# Patient Record
Sex: MALE | Race: BLACK OR AFRICAN AMERICAN | Employment: UNEMPLOYED | URBAN - METROPOLITAN AREA
[De-identification: names, ages, dates, MRNs, and addresses within clinical notes are randomized per-mention and may not be internally consistent; named-entity substitution may affect disease eponyms.]

---

## 2018-05-10 ENCOUNTER — HOSPITAL ENCOUNTER (OUTPATIENT)
Dept: GENERAL RADIOLOGY | Facility: CLINIC | Age: 83
Discharge: HOME | End: 2018-05-10
Attending: NURSE PRACTITIONER | Admitting: NURSE PRACTITIONER
Payer: COMMERCIAL

## 2018-05-10 DIAGNOSIS — R76.11 POSITIVE TB TEST: ICD-10-CM

## 2018-05-10 PROCEDURE — 71046 X-RAY EXAM CHEST 2 VIEWS: CPT

## 2019-06-12 ENCOUNTER — TRANSFERRED RECORDS (OUTPATIENT)
Dept: HEALTH INFORMATION MANAGEMENT | Facility: CLINIC | Age: 84
End: 2019-06-12

## 2019-08-27 ENCOUNTER — MEDICAL CORRESPONDENCE (OUTPATIENT)
Dept: HEALTH INFORMATION MANAGEMENT | Facility: CLINIC | Age: 84
End: 2019-08-27

## 2019-08-27 NOTE — TELEPHONE ENCOUNTER
RECORDS RECEIVED FROM:  Positive TB Quantiferon Gold- referred by the [facility]- appt per Referring Clinic   DATE RECEIVED: 09.23.2019   NOTES (Gather within 2 years) STATUS DETAILS   OFFICE NOTE from referring provider   Received 08.27.2019   OFFICE NOTE from other specialist N/A    DISCHARGE SUMMARY from hospital N/A    DISCHARGE REPORT from the ER N/A    LABS (any labs) Care Everywhere    MEDICATION LIST Received 08.27.2019   IMAGING  (NEED IMAGES AND REPORTS)     Osteomyelitis: Foot imaging  N/A    Liver Abscess: Abdominal imaging N/A    Other (anything related to diagnoses N/A

## 2019-09-23 ENCOUNTER — PRE VISIT (OUTPATIENT)
Dept: INFECTIOUS DISEASES | Facility: CLINIC | Age: 84
End: 2019-09-23

## 2019-09-23 ENCOUNTER — OFFICE VISIT (OUTPATIENT)
Dept: INFECTIOUS DISEASES | Facility: CLINIC | Age: 84
End: 2019-09-23
Attending: STUDENT IN AN ORGANIZED HEALTH CARE EDUCATION/TRAINING PROGRAM
Payer: COMMERCIAL

## 2019-09-23 VITALS
HEART RATE: 98 BPM | DIASTOLIC BLOOD PRESSURE: 108 MMHG | SYSTOLIC BLOOD PRESSURE: 177 MMHG | WEIGHT: 219.1 LBS | OXYGEN SATURATION: 97 % | TEMPERATURE: 99.1 F

## 2019-09-23 DIAGNOSIS — R76.12 POSITIVE QUANTIFERON-TB GOLD TEST: Primary | ICD-10-CM

## 2019-09-23 DIAGNOSIS — Z23 NEED FOR VACCINATION: ICD-10-CM

## 2019-09-23 PROCEDURE — G0463 HOSPITAL OUTPT CLINIC VISIT: HCPCS | Mod: ZF

## 2019-09-23 ASSESSMENT — PAIN SCALES - GENERAL: PAINLEVEL: NO PAIN (0)

## 2019-09-23 NOTE — PROGRESS NOTES
[facility]  Infectious Disease Consultation note  Today's Date: 09/23/2019    Assessment and Recommendations:  Jan Tucker is a 84 year old with PMH of HTN, HLD, asthma who is referred for positive quantiferon gold.     Positive quantiferon gold: likely true positive since he is from [place] and because of possible exposure to sister with TB. However he was treated for latent TB in 2004. Therefore I dont think he needs further eval or treatment for latent TB.     - Immunization status; needs flu and shingrix vaccination. Wrote letter to PCP about the same.     Attestation: I have reviewed today's vital signs, medications, labs and imaging. I personally reviewed the imaging. Reviewed medical history, surgical history, and family history in Epic History tab. No updates needed to be made.     Magdalena Olguin  , [facility]  Pager - [phone]    -------------------------------------------------------------------------------------------------------------------    Reason for consult / Chief complaint:   Consulted by Dr. Juan Way for positive quantiferon    History of presenting illness:  Jan Tucker is a 84 year old with PMH of HTN, HLD, asthma who is referred for positive quantiferon gold.     He is from [facility], moved to  [place] years ago. 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 He does not remember how long he took he took the medicine but took it for as long as he was supposed to without interruption. Since then he has been back to [facility] 3 times, 2 months each.     He was found to have a positive quantiferon gold at 0.5 in 5/2018. He had a CXR which was normal. He has no symptoms of active TB. His sister had a lot of cough and was diagnosed with TB 25 years ago. [place] the city.        Social Hx:  Social History     Tobacco Use     Smoking status: Never Smoker     Smokeless tobacco: Never Used   Substance Use Topics     Alcohol use: None     Drug use: None         Immunizations:  Tdap 2013  Typhoid, yellow fever 2010  Varicella 2004  Pneumovax 2017  prevnar 2003  Mening 2010    Allergies:   No Known Allergies      Medications:    acetaminophen (TYLENOL) 500 mg tablet Take 1 Tab by mouth every 6 (six) hours as needed for pain Not to exceed 2000 mg per day. 90 Tab 0     peg 400-propylene glycol, PF, (SYSTANE) 0.4-0.3 % ophthalmic solution Place 2 Drops into both eyes as needed for dry eyes 28 Each 1     albuterol sulfate (PROAIR HFA) 90 mcg/actuation inhaler Inhale 2 Puffs into the lungs every 4 (four) hours as needed for shortness of breath or wheezing 8.5 g 11     amLODIPine (NORVASC) 10 mg tablet Take 1 Tab by mouth once daily 30 Tab 11     atorvastatin (LIPITOR) 80 mg tablet Take 1 Tab by mouth once daily Replaces 40 mg tab 30 Tab 11     cholecalciferol, vitamin D3, 2,000 unit tablet Take 1 Tab by mouth once daily 100 Tab 3     lisinopril-hydrochlorothiazide (PRINZIDE,ZESTORETIC) 20-12.5 mg per tablet Take 1 Tab by mouth once daily 30 Tab 11     mometasone-formoterol (DULERA) 200-5 mcg/actuation inhaler Inhale 2 Puffs into the lungs 2 (two) times daily Do not exceed 2 puffs twice daily. 3 inhalers for travel. 13 g 11       Past Medical Hx:  As in HPI    Family History:  Family history of TB +    Review of Systems:  10 systems reviewed and were negative except for pertinent positives noted in my HPI      Examination:  Vital signs:   BP (!) 177/108   Pulse 98   Temp 99.1  F (37.3  C) (Oral)   Wt 99.4 kg (219 lb 1.6 oz)   SpO2 97%     Constitutional: Patient in no distress  Eyes: not pale, not jaundiced  Neck: no lymphadenopathy  CVS: no added sounds  RS: clear  Abdomen: soft, BS+  Skin: no rash  Extremities: no pedal edema  Psych: Alert and oriented x 3    Laboratory:  8/2019  CMP  reviewed     Microbiology:  5/9/18  QUANTIFERON(R)-TB GOLD POSITIVE (A)   Comment:  In healthy persons who have a low  likelihood both of M. tuberculosis  infection and of progression to   active tuberculosis if infected, a  single positive QFT result should not   be taken as reliable evidence of M.   tuberculosis infection. Repeat  testing, with either the initial test   or a different test, may be   considered on a case-by-case basis. NEGATIVE    NIL 0.07 IU/mL   MITOGEN-NIL 8.10 IU/mL   TB-NIL 0.54   Comment:  The Nil tube value is used to determine if the patient  has a preexisting immune response which could cause a   false-positive reading on the test. In order for a   test to be valid, the Nil tube must have a value of   less than or equal to 8.0 IU/mL.    The mitogen control tube is used to assure the patient   has a healthy immune status and also serves as a   control for correct blood handling and incubation. It   is used to detect false-negative readings. The mitogen   tube must have a gamma interferon value of greater   than or equal to 0.5 IU/mL higher than the value of   the Nil tube.    The TB antigen tube is coated with the M. tuberculosis  specific antigens. For a test to be considered   positive, the TB antigen tube value minus the Nil tube   value must be greater than or equal to 0.35 IU/mL.    For additional information, please refer to   http://education.[employer]/faq/QFT  (This link is being provided for informational/  educational purposes only.) IU/mL       Imaging:    CXR 5/2018 normal

## 2019-09-23 NOTE — LETTER
9/23/2019      RE: Jan Tucker  [address]   [address]  [facility] [address]       [facility]  Infectious Disease Consultation note  Today's Date: 09/23/2019    Assessment and Recommendations:  Jan Tucker is a 84 year old with PMH of HTN, HLD, asthma who is referred for positive quantiferon gold.     Positive quantiferon gold: likely true positive since he is from [place] and because of possible exposure to sister with TB. However he was treated for latent TB in 2004. Therefore I dont think he needs further eval or treatment for latent TB.     - Immunization status; needs flu and shingrix vaccination. Wrote letter to PCP about the same.     Attestation: I have reviewed today's vital signs, medications, labs and imaging. I personally reviewed the imaging. Reviewed medical history, surgical history, and family history in Epic History tab. No updates needed to be made.     Magdalena Olguin  , [facility]  Pager - [phone]    -------------------------------------------------------------------------------------------------------------------    Reason for consult / Chief complaint:   Consulted by Dr. Juan Way for positive quantiferon    History of presenting illness:  Jan Tucker is a 84 year old with PMH of HTN, HLD, asthma who is referred for positive quantiferon gold.     He is from [facility], moved to  [place] years ago. 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 He does not remember how long he took he took the medicine but took it for as long as he was supposed to without interruption. Since then he has been back to [facility] 3 times, 2 months each.     He was found to have a positive quantiferon gold at 0.5 in 5/2018. He had a CXR which was normal. He has no symptoms of active TB. His sister had a lot of cough and was diagnosed with TB 25 years ago. [place] and [place] city[place] visit [place] in the city.       Social Hx:  Social History     Tobacco Use     Smoking status: Never Smoker     Smokeless tobacco: Never Used   Substance Use Topics     Alcohol use: None     Drug use: None         Immunizations:  Tdap 2013  Typhoid, yellow fever 2010  Varicella 2004  Pneumovax 2017  prevnar 2003  Mening 2010    Allergies:   No Known Allergies      Medications:    acetaminophen (TYLENOL) 500 mg tablet Take 1 Tab by mouth every 6 (six) hours as needed for pain Not to exceed 2000 mg per day. 90 Tab 0     peg 400-propylene glycol, PF, (SYSTANE) 0.4-0.3 % ophthalmic solution Place 2 Drops into both eyes as needed for dry eyes 28 Each 1     albuterol sulfate (PROAIR HFA) 90 mcg/actuation inhaler Inhale 2 Puffs into the lungs every 4 (four) hours as needed for shortness of breath or wheezing 8.5 g 11     amLODIPine (NORVASC) 10 mg tablet Take 1 Tab by mouth once daily 30 Tab 11     atorvastatin (LIPITOR) 80 mg tablet Take 1 Tab by mouth once daily Replaces 40 mg tab 30 Tab 11     cholecalciferol, vitamin D3, 2,000 unit tablet Take 1 Tab by mouth once daily 100 Tab 3     lisinopril-hydrochlorothiazide (PRINZIDE,ZESTORETIC) 20-12.5 mg per tablet Take 1 Tab by mouth once daily 30 Tab 11     mometasone-formoterol (DULERA) 200-5 mcg/actuation inhaler Inhale 2 Puffs into the lungs 2 (two) times daily Do not exceed 2 puffs twice daily. 3 inhalers for travel. 13 g 11       Past Medical Hx:  As in HPI    Family History:  Family history of TB +    Review of Systems:  10 systems reviewed and were negative except for pertinent positives noted in my HPI      Examination:  Vital signs:   BP (!) 177/108   Pulse 98   Temp 99.1  F (37.3  C) (Oral)   Wt 99.4 kg (219 lb 1.6 oz)   SpO2 97%     Constitutional: Patient in no distress  Eyes: not pale, not jaundiced  Neck: no lymphadenopathy  CVS: no added sounds  RS: clear  Abdomen: soft, BS+  Skin: no  rash  Extremities: no pedal edema  Psych: Alert and oriented x 3    Laboratory:  8/2019  CMP reviewed     Microbiology:  5/9/18  QUANTIFERON(R)-TB GOLD POSITIVE (A)   Comment:  In healthy persons who have a low  likelihood both of M. tuberculosis  infection and of progression to   active tuberculosis if infected, a  single positive QFT result should not   be taken as reliable evidence of M.   tuberculosis infection. Repeat  testing, with either the initial test   or a different test, may be   considered on a case-by-case basis. NEGATIVE    NIL 0.07 IU/mL   MITOGEN-NIL 8.10 IU/mL   TB-NIL 0.54   Comment:  The Nil tube value is used to determine if the patient  has a preexisting immune response which could cause a   false-positive reading on the test. In order for a   test to be valid, the Nil tube must have a value of   less than or equal to 8.0 IU/mL.    The mitogen control tube is used to assure the patient   has a healthy immune status and also serves as a   control for correct blood handling and incubation. It   is used to detect false-negative readings. The mitogen   tube must have a gamma interferon value of greater   than or equal to 0.5 IU/mL higher than the value of   the Nil tube.    The TB antigen tube is coated with the M. tuberculosis  specific antigens. For a test to be considered   positive, the TB antigen tube value minus the Nil tube   value must be greater than or equal to 0.35 IU/mL.    For additional information, please refer to   http://education.[employer].com/faq/QFT  (This link is being provided for informational/  educational purposes only.) IU/mL       Imaging:    CXR 5/2018 normal              Magdalena Olguin MD

## 2019-09-23 NOTE — NURSING NOTE
Chief Complaint   Patient presents with     Consult     positive Quantiferon Gold     BP (!) 177/108   Pulse 98   Temp 99.1  F (37.3  C) (Oral)   Wt 99.4 kg (219 lb 1.6 oz)   SpO2 97%     Pt unsure of medication name he is taking. Provider notified.  Hannah Kelly CMA